# Patient Record
Sex: MALE | URBAN - METROPOLITAN AREA
[De-identification: names, ages, dates, MRNs, and addresses within clinical notes are randomized per-mention and may not be internally consistent; named-entity substitution may affect disease eponyms.]

---

## 2024-09-19 ENCOUNTER — TRANSCRIBE ORDERS (OUTPATIENT)
Age: 74
End: 2024-09-19

## 2024-09-19 DIAGNOSIS — I99.8 NONCOMPRESSIBLE BLOOD VESSELS OF EXTREMITY: Primary | ICD-10-CM

## 2024-10-04 ENCOUNTER — OFFICE VISIT (OUTPATIENT)
Age: 74
End: 2024-10-04
Payer: MEDICARE

## 2024-10-04 VITALS
BODY MASS INDEX: 29.69 KG/M2 | SYSTOLIC BLOOD PRESSURE: 148 MMHG | HEIGHT: 73 IN | WEIGHT: 224 LBS | DIASTOLIC BLOOD PRESSURE: 78 MMHG

## 2024-10-04 DIAGNOSIS — I73.9 PERIPHERAL ARTERIAL DISEASE: Primary | ICD-10-CM

## 2024-10-04 RX ORDER — LISINOPRIL 40 MG/1
TABLET ORAL DAILY
COMMUNITY
Start: 2012-09-07

## 2024-10-04 RX ORDER — DOXYCYCLINE HYCLATE 100 MG
TABLET ORAL
COMMUNITY

## 2024-10-04 RX ORDER — ALBUTEROL SULFATE 0.83 MG/ML
1 SOLUTION RESPIRATORY (INHALATION)
COMMUNITY

## 2024-10-04 RX ORDER — PANTOPRAZOLE SODIUM 40 MG/1
1 TABLET, DELAYED RELEASE ORAL 2 TIMES DAILY
COMMUNITY

## 2024-10-04 RX ORDER — DIGOXIN 125 MCG
125 TABLET ORAL DAILY
COMMUNITY
Start: 2024-07-16 | End: 2025-01-12

## 2024-10-04 RX ORDER — APIXABAN 2.5 MG/1
TABLET, FILM COATED ORAL
COMMUNITY

## 2024-10-04 RX ORDER — GENTAMICIN SULFATE 1 MG/G
OINTMENT TOPICAL
COMMUNITY

## 2024-10-04 RX ORDER — METFORMIN HCL 500 MG
TABLET, EXTENDED RELEASE 24 HR ORAL
COMMUNITY
Start: 2012-07-19

## 2024-10-04 RX ORDER — SITAGLIPTIN 100 MG/1
1 TABLET, FILM COATED ORAL DAILY
COMMUNITY

## 2024-10-04 RX ORDER — BUMETANIDE 2 MG/1
TABLET ORAL
COMMUNITY

## 2024-10-04 RX ORDER — TAMSULOSIN HYDROCHLORIDE 0.4 MG/1
CAPSULE ORAL
COMMUNITY

## 2024-10-04 RX ORDER — AMLODIPINE BESYLATE 5 MG/1
TABLET ORAL
COMMUNITY
Start: 2012-09-07

## 2024-10-04 RX ORDER — POTASSIUM CHLORIDE 1500 MG/1
20-40 TABLET, EXTENDED RELEASE ORAL
COMMUNITY

## 2024-10-04 RX ORDER — ROSUVASTATIN CALCIUM 20 MG/1
20 TABLET, COATED ORAL DAILY
COMMUNITY
Start: 2012-09-07

## 2024-10-04 RX ORDER — FINASTERIDE 5 MG/1
TABLET, FILM COATED ORAL
COMMUNITY

## 2024-10-04 RX ORDER — DAPAGLIFLOZIN 10 MG/1
1 TABLET, FILM COATED ORAL DAILY
COMMUNITY

## 2024-10-04 RX ORDER — BLOOD SUGAR DIAGNOSTIC
STRIP MISCELLANEOUS
COMMUNITY
Start: 2024-08-10

## 2024-10-04 NOTE — PROGRESS NOTES
"Chief Complaint  New Patient  (Abnormal LILLIE, PAD. )    Referral for peripheral arterial disease, nonhealing left toe wound    Subjective        Estuardo Bear presents to [facility] VASCULAR SURGERY  History of Present Illness    Pleasant 74-year-old gentleman with numerous cardiac comorbidities including aortic valve stenosis status post TAVR, cardiomyopathy status post [employer] ICD, permanent atrial fibrillation, combined systolic and diastolic heart failure, previous mitral clip in 2020, COPD, CKD, sleep apnea who is referred for nonhealing left toe wound and peripheral arterial disease.  Patient reports he developed a left medial first toe wound in July that has been debrided and followed by his podiatrist Dr. Coley, and still has not healed.  This despite his glucose being well-controlled.  He denies any history of tobacco use.  He had ABIs performed at [facility] that were noncompressible bilaterally and had noncompressible toe pressures on the left    Objective   Vital Signs:  /78   Ht 185.4 cm (73\")   Wt 102 kg (224 lb)   BMI 29.55 kg/m²   Estimated body mass index is 29.55 kg/m² as calculated from the following:    Height as of this encounter: 185.4 cm (73\").    Weight as of this encounter: 102 kg (224 lb).         BMI cannot be calculated due to outdated height or weight values.  Please input a current height/weight in Vitals and re-renter BMIFOLLOWUP in Note to pull in correct documentation based on BMI range.         Physical Exam   NAD  Respirations unlabored  Irregular rhythm  Nonpalpable pedal pulses.  DP and PT signals present bilaterally  Palpable popliteal pulses bilaterally  Small ulceration approximately 3 mm in diameter on medial aspect of left first toe, no purulence or cellulitis.  Minimal tenderness  Result Review :                     Assessment and Plan     74-year-old gentleman with cardiomyopathy and numerous other cardiac comorbidities, diabetes, " CKD referred for for nonhealing left toe wound due to concern for peripheral arterial disease.  The patient has strong DP and PT signals bilaterally but noncompressible ABIs noncompressible toe pressures on the left.  He has palpable popliteal and femoral pulses bilaterally.  Previous CTA abdomen pelvis from 4 years ago did not show significant aortoiliac disease.   I discussed the nature of peripheral arterial disease as well as rationale and indications for intervention with the patient.  I think with his toe wound having not heal despite what sounds like good wound care at podiatry in good glycemic control that arteriogram with possible intervention is warranted.  Details of this procedure including risk benefits and alternatives were discussed with the patient he verbalized understanding and agrees to proceed.  Will schedule this at his earliest convenience.  His wife is a former radiology technician and familiar with the procedure and verbalizes understanding and agreement with it as well.  He will need to hold his Eliquis for 2 days prior to the intervention as well as his metformin.      Diagnoses and all orders for this visit:    1. Peripheral arterial disease (Primary)  -     Case Request; Standing  -     ceFAZolin (ANCEF) 2,000 mg in sodium chloride 0.9 % 100 mL IVPB  -     Case Request    Other orders  -     Follow Anesthesia Guidelines / Protocol; Future  -     Follow Anesthesia Guidelines / Protocol; Standing  -     Verify NPO Status; Standing  -     Clip Operative Site; Standing  -     Patient to Void Prior to Transfer to OR; Standing  -     Verify / Perform Chlorhexidine Skin Prep; Standing  -     Provide Patient With Instructions on NPO Status; Future  -     Provide Chlorhexidine Skin Prep Wipes and Instructions; Future  -     Place Sequential Compression Device; Standing  -     Maintain Sequential Compression Device; Standing  -     Instruct Patient to Hold Medications (Specify);  Future              Patient BMI noted. Educational material for patient for health risks of being overweight added to patient's after visit summary.           Follow Up     No follow-ups on file.  Patient was given instructions and counseling regarding his condition or for health maintenance advice. Please see specific information pulled into the AVS if appropriate.

## 2024-10-17 ENCOUNTER — HOSPITAL ENCOUNTER (OUTPATIENT)
Facility: HOSPITAL | Age: 74
Setting detail: HOSPITAL OUTPATIENT SURGERY
Discharge: HOME | End: 2024-10-17
Attending: STUDENT IN AN ORGANIZED HEALTH CARE EDUCATION/TRAINING PROGRAM | Admitting: STUDENT IN AN ORGANIZED HEALTH CARE EDUCATION/TRAINING PROGRAM
Payer: MEDICARE

## 2024-10-17 ENCOUNTER — ANESTHESIA (OUTPATIENT)
Dept: PERIOP | Facility: HOSPITAL | Age: 74
End: 2024-10-17
Payer: MEDICARE

## 2024-10-17 ENCOUNTER — ANESTHESIA EVENT (OUTPATIENT)
Dept: PERIOP | Facility: HOSPITAL | Age: 74
End: 2024-10-17
Payer: MEDICARE

## 2024-10-17 ENCOUNTER — APPOINTMENT (OUTPATIENT)
Dept: GENERAL RADIOLOGY | Facility: HOSPITAL | Age: 74
End: 2024-10-17
Payer: MEDICARE

## 2024-10-17 VITALS
BODY MASS INDEX: 29.61 KG/M2 | WEIGHT: 224.43 LBS | SYSTOLIC BLOOD PRESSURE: 154 MMHG | RESPIRATION RATE: 16 BRPM | TEMPERATURE: 97.9 F | OXYGEN SATURATION: 93 % | HEART RATE: 59 BPM | DIASTOLIC BLOOD PRESSURE: 92 MMHG

## 2024-10-17 DIAGNOSIS — I73.9 PERIPHERAL ARTERIAL DISEASE: ICD-10-CM

## 2024-10-17 LAB
ALBUMIN SERPL-MCNC: 3.8 G/DL (ref 3.5–5.2)
ALBUMIN/GLOB SERPL: 1.5 G/DL
ALP SERPL-CCNC: 61 U/L (ref 39–117)
ALT SERPL W P-5'-P-CCNC: 12 U/L (ref 1–41)
ANION GAP SERPL CALCULATED.3IONS-SCNC: 8.4 MMOL/L (ref 5–15)
AST SERPL-CCNC: 13 U/L (ref 1–40)
BASOPHILS # BLD AUTO: 0.06 10*3/MM3 (ref 0–0.2)
BASOPHILS NFR BLD AUTO: 0.8 % (ref 0–1.5)
BILIRUB SERPL-MCNC: 0.7 MG/DL (ref 0–1.2)
BUN SERPL-MCNC: 10 MG/DL (ref 8–23)
BUN/CREAT SERPL: 7.4 (ref 7–25)
CALCIUM SPEC-SCNC: 8.9 MG/DL (ref 8.6–10.5)
CHLORIDE SERPL-SCNC: 102 MMOL/L (ref 98–107)
CO2 SERPL-SCNC: 25.6 MMOL/L (ref 22–29)
CREAT SERPL-MCNC: 1.35 MG/DL (ref 0.76–1.27)
DEPRECATED RDW RBC AUTO: 43.6 FL (ref 37–54)
EGFRCR SERPLBLD CKD-EPI 2021: 55.4 ML/MIN/1.73
EOSINOPHIL # BLD AUTO: 0.1 10*3/MM3 (ref 0–0.4)
EOSINOPHIL NFR BLD AUTO: 1.3 % (ref 0.3–6.2)
ERYTHROCYTE [DISTWIDTH] IN BLOOD BY AUTOMATED COUNT: 13.8 % (ref 12.3–15.4)
GLOBULIN UR ELPH-MCNC: 2.6 GM/DL
GLUCOSE BLDC GLUCOMTR-MCNC: 152 MG/DL (ref 70–130)
GLUCOSE SERPL-MCNC: 144 MG/DL (ref 65–99)
HCT VFR BLD AUTO: 44 % (ref 37.5–51)
HGB BLD-MCNC: 15.1 G/DL (ref 13–17.7)
IMM GRANULOCYTES # BLD AUTO: 0.03 10*3/MM3 (ref 0–0.05)
IMM GRANULOCYTES NFR BLD AUTO: 0.4 % (ref 0–0.5)
LYMPHOCYTES # BLD AUTO: 1.59 10*3/MM3 (ref 0.7–3.1)
LYMPHOCYTES NFR BLD AUTO: 20.6 % (ref 19.6–45.3)
MCH RBC QN AUTO: 29.5 PG (ref 26.6–33)
MCHC RBC AUTO-ENTMCNC: 34.3 G/DL (ref 31.5–35.7)
MCV RBC AUTO: 85.9 FL (ref 79–97)
MONOCYTES # BLD AUTO: 0.59 10*3/MM3 (ref 0.1–0.9)
MONOCYTES NFR BLD AUTO: 7.7 % (ref 5–12)
NEUTROPHILS NFR BLD AUTO: 5.33 10*3/MM3 (ref 1.7–7)
NEUTROPHILS NFR BLD AUTO: 69.2 % (ref 42.7–76)
NRBC BLD AUTO-RTO: 0 /100 WBC (ref 0–0.2)
PLATELET # BLD AUTO: 179 10*3/MM3 (ref 140–450)
PMV BLD AUTO: 10.2 FL (ref 6–12)
POTASSIUM SERPL-SCNC: 4.3 MMOL/L (ref 3.5–5.2)
PROT SERPL-MCNC: 6.4 G/DL (ref 6–8.5)
RBC # BLD AUTO: 5.12 10*6/MM3 (ref 4.14–5.8)
SODIUM SERPL-SCNC: 136 MMOL/L (ref 136–145)
WBC NRBC COR # BLD AUTO: 7.7 10*3/MM3 (ref 3.4–10.8)

## 2024-10-17 PROCEDURE — C1769 GUIDE WIRE: HCPCS | Performed by: STUDENT IN AN ORGANIZED HEALTH CARE EDUCATION/TRAINING PROGRAM

## 2024-10-17 PROCEDURE — 25810000003 LACTATED RINGERS PER 1000 ML: Performed by: ANESTHESIOLOGY

## 2024-10-17 PROCEDURE — 85025 COMPLETE CBC W/AUTO DIFF WBC: CPT | Performed by: STUDENT IN AN ORGANIZED HEALTH CARE EDUCATION/TRAINING PROGRAM

## 2024-10-17 PROCEDURE — 80053 COMPREHEN METABOLIC PANEL: CPT | Performed by: STUDENT IN AN ORGANIZED HEALTH CARE EDUCATION/TRAINING PROGRAM

## 2024-10-17 PROCEDURE — 25010000002 HEPARIN (PORCINE) PER 1000 UNITS: Performed by: STUDENT IN AN ORGANIZED HEALTH CARE EDUCATION/TRAINING PROGRAM

## 2024-10-17 PROCEDURE — 25010000002 LIDOCAINE 2% SOLUTION: Performed by: NURSE ANESTHETIST, CERTIFIED REGISTERED

## 2024-10-17 PROCEDURE — S0260 H&P FOR SURGERY: HCPCS | Performed by: STUDENT IN AN ORGANIZED HEALTH CARE EDUCATION/TRAINING PROGRAM

## 2024-10-17 PROCEDURE — C1894 INTRO/SHEATH, NON-LASER: HCPCS | Performed by: STUDENT IN AN ORGANIZED HEALTH CARE EDUCATION/TRAINING PROGRAM

## 2024-10-17 PROCEDURE — C1887 CATHETER, GUIDING: HCPCS | Performed by: STUDENT IN AN ORGANIZED HEALTH CARE EDUCATION/TRAINING PROGRAM

## 2024-10-17 PROCEDURE — 25010000002 PROPOFOL 10 MG/ML EMULSION: Performed by: NURSE ANESTHETIST, CERTIFIED REGISTERED

## 2024-10-17 PROCEDURE — 25010000002 BUPIVACAINE (PF) 0.25 % SOLUTION 30 ML VIAL: Performed by: STUDENT IN AN ORGANIZED HEALTH CARE EDUCATION/TRAINING PROGRAM

## 2024-10-17 PROCEDURE — 75625 CONTRAST EXAM ABDOMINL AORTA: CPT | Performed by: STUDENT IN AN ORGANIZED HEALTH CARE EDUCATION/TRAINING PROGRAM

## 2024-10-17 PROCEDURE — 82948 REAGENT STRIP/BLOOD GLUCOSE: CPT

## 2024-10-17 PROCEDURE — 75710 ARTERY X-RAYS ARM/LEG: CPT | Performed by: STUDENT IN AN ORGANIZED HEALTH CARE EDUCATION/TRAINING PROGRAM

## 2024-10-17 PROCEDURE — C1760 CLOSURE DEV, VASC: HCPCS | Performed by: STUDENT IN AN ORGANIZED HEALTH CARE EDUCATION/TRAINING PROGRAM

## 2024-10-17 PROCEDURE — 36247 INS CATH ABD/L-EXT ART 3RD: CPT | Performed by: STUDENT IN AN ORGANIZED HEALTH CARE EDUCATION/TRAINING PROGRAM

## 2024-10-17 PROCEDURE — 25510000001 IOPAMIDOL PER 1 ML: Performed by: STUDENT IN AN ORGANIZED HEALTH CARE EDUCATION/TRAINING PROGRAM

## 2024-10-17 PROCEDURE — 25010000002 CEFAZOLIN PER 500 MG: Performed by: STUDENT IN AN ORGANIZED HEALTH CARE EDUCATION/TRAINING PROGRAM

## 2024-10-17 PROCEDURE — 25010000002 LIDOCAINE 1 % SOLUTION 20 ML VIAL: Performed by: STUDENT IN AN ORGANIZED HEALTH CARE EDUCATION/TRAINING PROGRAM

## 2024-10-17 RX ORDER — NALOXONE HCL 0.4 MG/ML
0.2 VIAL (ML) INJECTION AS NEEDED
Status: DISCONTINUED | OUTPATIENT
Start: 2024-10-17 | End: 2024-10-17 | Stop reason: HOSPADM

## 2024-10-17 RX ORDER — DIPHENHYDRAMINE HYDROCHLORIDE 50 MG/ML
12.5 INJECTION, SOLUTION INTRAMUSCULAR; INTRAVENOUS
Status: DISCONTINUED | OUTPATIENT
Start: 2024-10-17 | End: 2024-10-17 | Stop reason: HOSPADM

## 2024-10-17 RX ORDER — FENTANYL CITRATE 50 UG/ML
50 INJECTION, SOLUTION INTRAMUSCULAR; INTRAVENOUS ONCE AS NEEDED
Status: DISCONTINUED | OUTPATIENT
Start: 2024-10-17 | End: 2024-10-17 | Stop reason: HOSPADM

## 2024-10-17 RX ORDER — DROPERIDOL 2.5 MG/ML
0.62 INJECTION, SOLUTION INTRAMUSCULAR; INTRAVENOUS
Status: DISCONTINUED | OUTPATIENT
Start: 2024-10-17 | End: 2024-10-17 | Stop reason: HOSPADM

## 2024-10-17 RX ORDER — LIDOCAINE HYDROCHLORIDE 10 MG/ML
0.5 INJECTION, SOLUTION INFILTRATION; PERINEURAL ONCE AS NEEDED
Status: DISCONTINUED | OUTPATIENT
Start: 2024-10-17 | End: 2024-10-17 | Stop reason: HOSPADM

## 2024-10-17 RX ORDER — LIDOCAINE HYDROCHLORIDE 20 MG/ML
INJECTION, SOLUTION INFILTRATION; PERINEURAL AS NEEDED
Status: DISCONTINUED | OUTPATIENT
Start: 2024-10-17 | End: 2024-10-17 | Stop reason: SURG

## 2024-10-17 RX ORDER — FENTANYL CITRATE 50 UG/ML
25 INJECTION, SOLUTION INTRAMUSCULAR; INTRAVENOUS
Status: DISCONTINUED | OUTPATIENT
Start: 2024-10-17 | End: 2024-10-17 | Stop reason: HOSPADM

## 2024-10-17 RX ORDER — CARVEDILOL 6.25 MG/1
6.25 TABLET ORAL 2 TIMES DAILY WITH MEALS
COMMUNITY

## 2024-10-17 RX ORDER — LABETALOL HYDROCHLORIDE 5 MG/ML
5 INJECTION, SOLUTION INTRAVENOUS
Status: DISCONTINUED | OUTPATIENT
Start: 2024-10-17 | End: 2024-10-17 | Stop reason: HOSPADM

## 2024-10-17 RX ORDER — FLUMAZENIL 0.1 MG/ML
0.2 INJECTION INTRAVENOUS AS NEEDED
Status: DISCONTINUED | OUTPATIENT
Start: 2024-10-17 | End: 2024-10-17 | Stop reason: HOSPADM

## 2024-10-17 RX ORDER — IPRATROPIUM BROMIDE AND ALBUTEROL SULFATE 2.5; .5 MG/3ML; MG/3ML
3 SOLUTION RESPIRATORY (INHALATION) ONCE AS NEEDED
Status: DISCONTINUED | OUTPATIENT
Start: 2024-10-17 | End: 2024-10-17 | Stop reason: HOSPADM

## 2024-10-17 RX ORDER — ONDANSETRON 2 MG/ML
4 INJECTION INTRAMUSCULAR; INTRAVENOUS ONCE AS NEEDED
Status: DISCONTINUED | OUTPATIENT
Start: 2024-10-17 | End: 2024-10-17 | Stop reason: HOSPADM

## 2024-10-17 RX ORDER — HYDRALAZINE HYDROCHLORIDE 20 MG/ML
5 INJECTION INTRAMUSCULAR; INTRAVENOUS
Status: DISCONTINUED | OUTPATIENT
Start: 2024-10-17 | End: 2024-10-17 | Stop reason: HOSPADM

## 2024-10-17 RX ORDER — SODIUM CHLORIDE 0.9 % (FLUSH) 0.9 %
3 SYRINGE (ML) INJECTION EVERY 12 HOURS SCHEDULED
Status: DISCONTINUED | OUTPATIENT
Start: 2024-10-17 | End: 2024-10-17 | Stop reason: HOSPADM

## 2024-10-17 RX ORDER — IOPAMIDOL 510 MG/ML
100 INJECTION, SOLUTION INTRAVASCULAR
Status: COMPLETED | OUTPATIENT
Start: 2024-10-17 | End: 2024-10-17

## 2024-10-17 RX ORDER — FAMOTIDINE 10 MG/ML
20 INJECTION, SOLUTION INTRAVENOUS ONCE
Status: COMPLETED | OUTPATIENT
Start: 2024-10-17 | End: 2024-10-17

## 2024-10-17 RX ORDER — SODIUM CHLORIDE, SODIUM LACTATE, POTASSIUM CHLORIDE, CALCIUM CHLORIDE 600; 310; 30; 20 MG/100ML; MG/100ML; MG/100ML; MG/100ML
9 INJECTION, SOLUTION INTRAVENOUS CONTINUOUS
Status: DISCONTINUED | OUTPATIENT
Start: 2024-10-17 | End: 2024-10-17 | Stop reason: HOSPADM

## 2024-10-17 RX ORDER — PROMETHAZINE HYDROCHLORIDE 25 MG/1
25 TABLET ORAL ONCE AS NEEDED
Status: DISCONTINUED | OUTPATIENT
Start: 2024-10-17 | End: 2024-10-17 | Stop reason: HOSPADM

## 2024-10-17 RX ORDER — HYDROCODONE BITARTRATE AND ACETAMINOPHEN 7.5; 325 MG/1; MG/1
1 TABLET ORAL EVERY 4 HOURS PRN
Status: DISCONTINUED | OUTPATIENT
Start: 2024-10-17 | End: 2024-10-17 | Stop reason: HOSPADM

## 2024-10-17 RX ORDER — PROMETHAZINE HYDROCHLORIDE 25 MG/1
25 SUPPOSITORY RECTAL ONCE AS NEEDED
Status: DISCONTINUED | OUTPATIENT
Start: 2024-10-17 | End: 2024-10-17 | Stop reason: HOSPADM

## 2024-10-17 RX ORDER — HYDROCODONE BITARTRATE AND ACETAMINOPHEN 5; 325 MG/1; MG/1
1 TABLET ORAL ONCE AS NEEDED
Status: DISCONTINUED | OUTPATIENT
Start: 2024-10-17 | End: 2024-10-17 | Stop reason: HOSPADM

## 2024-10-17 RX ORDER — HYDROMORPHONE HYDROCHLORIDE 1 MG/ML
0.25 INJECTION, SOLUTION INTRAMUSCULAR; INTRAVENOUS; SUBCUTANEOUS
Status: DISCONTINUED | OUTPATIENT
Start: 2024-10-17 | End: 2024-10-17 | Stop reason: HOSPADM

## 2024-10-17 RX ORDER — FERROUS SULFATE 325(65) MG
325 TABLET ORAL
COMMUNITY
Start: 2024-07-15

## 2024-10-17 RX ORDER — EPHEDRINE SULFATE 50 MG/ML
5 INJECTION, SOLUTION INTRAVENOUS ONCE AS NEEDED
Status: DISCONTINUED | OUTPATIENT
Start: 2024-10-17 | End: 2024-10-17 | Stop reason: HOSPADM

## 2024-10-17 RX ORDER — SODIUM CHLORIDE 0.9 % (FLUSH) 0.9 %
3-10 SYRINGE (ML) INJECTION AS NEEDED
Status: DISCONTINUED | OUTPATIENT
Start: 2024-10-17 | End: 2024-10-17 | Stop reason: HOSPADM

## 2024-10-17 RX ADMIN — SODIUM CHLORIDE, POTASSIUM CHLORIDE, SODIUM LACTATE AND CALCIUM CHLORIDE 9 ML/HR: 600; 310; 30; 20 INJECTION, SOLUTION INTRAVENOUS at 07:20

## 2024-10-17 RX ADMIN — IOPAMIDOL 46 ML: 510 INJECTION, SOLUTION INTRAVASCULAR at 08:13

## 2024-10-17 RX ADMIN — FAMOTIDINE 20 MG: 10 INJECTION INTRAVENOUS at 07:20

## 2024-10-17 RX ADMIN — LIDOCAINE HYDROCHLORIDE 60 MG: 20 INJECTION, SOLUTION INFILTRATION; PERINEURAL at 07:51

## 2024-10-17 RX ADMIN — SODIUM CHLORIDE 2000 MG: 900 INJECTION INTRAVENOUS at 07:20

## 2024-10-17 RX ADMIN — PROPOFOL 75 MCG/KG/MIN: 10 INJECTION, EMULSION INTRAVENOUS at 07:51

## 2024-10-17 NOTE — OP NOTE
Date of Admission:  10/17/2024  10/17/24  Terrance Andersen II, MD  [facility]    Preoperative Diagnosis:   Peripheral arterial disease with minor tissue loss    Postoperative Diagnosis:   Same    Procedure Performed:   Ultrasound-guided access right common femoral artery  Abdominal aortogram  Left leg arteriogram  Selective catheter placement right common femoral artery to left superficial femoral artery  Mynx closure right common femoral artery          Surgeon:   Terrance Andersen II, MD    Assistant:    Rosa Maria Lloyd RN, Provided critical assistance in exposure, retraction, and suction that overall decrease blood loss and operative time.    Anesthesia:   MAC with local    Estimated Blood Loss:   Minimal    Findings:    Abdominal aortogram: Patent renal arteries bilaterally without evidence of stenosis, widely patent infrarenal aorta with no stenosis or aneurysm identified, widely patent common internal and external iliac arteries with no evidence of stenosis or aneurysm.    Left leg arteriogram: Medial calcinosis noted throughout lower extremity arteries, common femoral artery widely patent, profundofemoral artery widely patent, superficial femoral artery widely patent, popliteal artery widely patent, patient has high takeoff of anterior tibial artery that completely occludes shortly after takeoff, posterior tibial artery widely patent and provides primary runoff to the left foot, peroneal artery patent and terminates at anatomically normal position just above the foot.  Numerous collaterals noted below the knee with reconstitution of dorsalis pedis artery in the foot.  Plantar arteries supplied by posterior tibial patent to the toes with filling of digital arteries noted in slight hyperemic blush noted in the area of patients toe wound.     Implants:    Nothing was implanted during the procedure    Specimen:   none    Complications:   none    Access:  5 Russian retrograde access right common femoral  artery    Closure:  5 Kiswahili MYNX closure device    Dispo:  To PACU    Indication for procedure:   74 y.o. male with numerous medical comorbidities newly identified as having peripheral arterial disease with noncompressible arteries on ABIs and noncompressible toe pressures as well.  He was referred to me after having the studies done at [facility].  Plans made for left leg arteriogram.  Details of this procedure including risk benefits and alternatives were discussed with the patient and he verbalized understanding and agrees to proceed.    Description of procedure:   Patient is taken to the OR and placed supine on the table.  Sedation was initiated by the anesthesia service.  Groins were prepped with ChloraPrep bilaterally and his draped in sterile fashion.  A timeout performed.  I began procedure by marking the site of the right femoral head under fluoroscopy.  I then used ultrasound evaluate the right common femoral artery.  Local anesthetic was infiltrated over the right common femoral artery under ultrasound guidance.  Then under ultrasound guidance I accessed the right common femoral artery with a microneedle microwire was inserted.  Placement of the femoral head was confirmed with fluoroscopy.  I then exchanged microneedle for microsheath and performed angiogram of the access site.  This looked appropriate.  I then advanced a Glidewire through the micro sheath and into the aorta.  Microsheath was exchanged for a 10 cm and a 5 Georgian sheath.  An Omni Flush catheter was advanced over the wire and an abdominal aortogram was performed with the above listed findings.  I then used the Omni Flush catheter to select the left iliac system and was able to advance the wire and catheter into the distal left external iliac artery.  We then performed a left leg runoff arteriogram from this position with the above listed findings.    I then advanced 260cm Glidewire advantage through the Omni Flush catheter  and into the superficial femoral artery.  Omni Flush was removed and an angled Bernal cross catheter was used to advance the wire and catheter down through the superficial femoral artery and into the popliteal artery.  I then performed angiography of the lower leg and foot through this Bernal cross catheter.  Patient has inline flow to the foot via posterior tibial artery which has significant medial calcinosis but no high-grade stenoses.  Anterior tibial artery had long segment occlusion and did not appear to be a good target for revascularization..  There was filling of digital arteries with some slight hyperemia at the area of the patient's toe wound.  I did not see a good target for intervention.  The Bernal cross catheter was removed and then we closed the right common femoral artery with a Mynx device and pressure was held until hemostasis was obtained.  We confirmed the patient had signals in his foot at the conclusion of the procedure.  Patient tolerated procedure well and there were no IntraOp complications and all wires catheters sheaths and other devices were removed and found to be whole and intact prior to the conclusion of the procedure.    Terrance Andersen II, MD  10/17/24    Active Hospital Problems    Diagnosis  POA    **Peripheral arterial disease [I73.9]  Yes      Resolved Hospital Problems   No resolved problems to display.

## 2024-10-17 NOTE — H&P
"  Name: Estuardo Bear ADMIT: 10/17/2024   : 1950  PCP: Terrance King MD    MRN: [id number] LOS: 0 days   AGE/SEX: 74 y.o. male  ROOM: [facility] Main OR/MAIN OR     No chief complaint on file.      Subjective   Patient is a 74 y.o. male presents for left leg arteriogram.  Patient has peripheral arterial disease with tissue loss on his left first, and now fourth and fifth toes.  ABIs from outside facility are noncompressible of the tibial arteries and digital arteries with nonpalpable pedal pulses on exam.    History of Present Illness    History reviewed. No pertinent past medical history.  Past Surgical History:   Procedure Laterality Date    ANOMALOUS PULMONARY VENOUS RETURN REPAIR, TOTAL      APPENDECTOMY      CARDIAC CATHETERIZATION      CARDIAC SURGERY      COLONOSCOPY      ENDOSCOPY      EYE SURGERY      HERNIA REPAIR       History reviewed. No pertinent family history.  Social History     Tobacco Use    Smoking status: Never   Substance Use Topics    Alcohol use: Never    Drug use: Never     Medications Prior to Admission   Medication Sig Dispense Refill Last Dose/Taking    Accu-Chek Berta Plus test strip    10/17/2024 at  1:30 AM    bumetanide (BUMEX) 2 MG tablet TAKE ONE TABLET BY MOUTH TWICE A DAY AS DIRECTED. TAKE ONE-HALF TO ONE TABLET EXTRA FOR GREATER THAN 3 LBS IN 24 HOURS.   10/17/2024 at  2:00 AM    carvedilol (COREG) 6.25 MG tablet Take 1 tablet by mouth 2 (Two) Times a Day With Meals. \"PT. STATES HE TAKES THREE TABLETS BY MOUTH TWO TIMES DAILY\"   10/17/2024 at  2:00 AM    digoxin (LANOXIN) 125 MCG tablet Take 1 tablet by mouth Daily.   10/16/2024 at  6:00 PM    Farxiga 10 MG tablet Take 10 mg by mouth Daily.   10/17/2024 at  2:00 AM    FeroSul 325 (65 Fe) MG tablet Take 1 tablet by mouth Daily With Breakfast.   10/17/2024 at  2:00 AM    finasteride (PROSCAR) 5 MG tablet TAKE ONE TABLET BY MOUTH ONCE PER DAY   10/17/2024 at  2:00 AM    gentamicin (GARAMYCIN) 0.1 % ointment APPLY " A SMALL AMOUNT TO THE AFFECTED AREA BY TOPICAL ROUTE 3 TIMES PER DAY   10/16/2024 at  6:00 PM    Januvia 100 MG tablet Take 1 tablet by mouth Daily.   10/17/2024 at  2:00 AM    pantoprazole (PROTONIX) 40 MG EC tablet Take 1 tablet by mouth 2 (Two) Times a Day.   10/17/2024 at  2:00 AM    potassium chloride (KLOR-CON M20) 20 MEQ CR tablet Take 1-2 tablets by mouth.   10/17/2024 at  2:00 AM    rosuvastatin (CRESTOR) 20 MG tablet Take 1 tablet by mouth Daily.   10/17/2024 at  2:00 AM    sacubitril-valsartan (ENTRESTO)  MG tablet Take 1 tablet by mouth 2 (Two) Times a Day.   10/17/2024 at  2:00 AM    tamsulosin (FLOMAX) 0.4 MG capsule 24 hr capsule TAKE ONE CAPSULE BY MOUTH EVERY DAY AS DIRECTED   10/16/2024 at  6:00 PM    albuterol (PROVENTIL) (2.5 MG/3ML) 0.083% nebulizer solution Inhale 1 ampule.   More than a month    amLODIPine (NORVASC) 5 MG tablet TAKE ONE TABLET BY MOUTH ONCE a DAY DOSE INCREASE   More than a month    doxycycline (VIBRAMYICN) 100 MG tablet Take 1 tablet twice a day by oral route as directed for 14 days, for cellulitis.   More than a month    Eliquis 2.5 MG tablet tablet TAKE ONE TABLET BY MOUTH TWICE A DAY AS DIRECTED   10/15/2024 at  7:00 AM    lisinopril (PRINIVIL,ZESTRIL) 40 MG tablet Take  by mouth Daily.   More than a month    metFORMIN ER (GLUCOPHAGE-XR) 500 MG 24 hr tablet TAKE TWO TABLETS BY MOUTH TWICE A DAY AS DIRECTED   10/15/2024 at  7:00 AM     Allergies:  Ferumoxytol and Penicillins    Review of Systems     Objective    Vital Signs  Temp:  [97.9 °F (36.6 °C)] 97.9 °F (36.6 °C)  Heart Rate:  [60] 60  Resp:  [18] 18  BP: (169)/(81) 169/81  SpO2:  [97 %] 97 %  on   ;   Device (Oxygen Therapy): room air  Body mass index is 29.61 kg/m².    Physical Exam  NAD  NCAT  Respirations unlabored  Palpable popliteal pulses  Nonpalpable pedal pulses  Approximately 8 mm diameter superficial ulceration on medial aspect of left first toe with no surrounding cellulitis.  Scabs noted on dorsal  aspect of fourth and fifth toes which are new.    Results Review:   I reviewed the patient's new clinical results.  Imaging Results (Last 24 Hours)       ** No results found for the last 24 hours. **            Assessment & Plan       Peripheral arterial disease      Assessment & Plan  74-year-old gentleman with numerous medical comorbidities including congestive heart failure/cardiomyopathy, atrial fibrillation, aortic valve stenosis status post TAVR, ICD placement, previous mitral clip, COPD, CKD, sleep apnea, now with peripheral arterial disease with tissue loss in his left foot presenting for arteriogram with possible intervention.  Details of this procedure including risks benefits and alternatives were discussed with the patient and his wife and they verbalized understanding and agreed to proceed.  Planning for outpatient procedure today.  Cefazolin ordered for perioperative antibiotics.        I discussed the patients findings and my recommendations with patient and family.          Terrance Andersen II, MD  [facility]  [phone]  10/17/24  07:16 EDT

## 2024-10-17 NOTE — DISCHARGE INSTRUCTIONS
[facility]  Patel Pool, Giovani Park Scherrer, [place], [place]  [address]  [place] [address]  [phone]      Discharge Instructions for Angiogram    Go home, rest and take it easy today.      You may experience some dizziness or memory loss from the anesthesia.  This may last for the next 24 hours.  Someone should plan on staying with you for the first 24 hours for your safety.    Do not make any important legal decisions or sign any legal papers for the next 24 hours.      Eat and drink lightly today.  Start off with liquids, jello, soup, crackers or other bland foods at first. You may advance your diet tomorrow as tolerated as long as you do not experience any nausea or vomiting.    You may resume routine medications including blood thinners. However, Glucophage should be not be started for 72 hours after the dye is given.      No lifting over 10 pounds and no strenuous activity for the next 2-3 days.    Try not to strain or bear down when your bowels move or when you empty your bladder.    Limit going up and down steps for 2 days.    No driving for the remainder of the day.  You may resume limited driving tomorrow if necessary.      You may shower tomorrow.  No bath or hot tubs for at least 2 days after the procedure.          Leave the pressure dressing on until tomorrow morning.  You may then take this off, as well as the small see through dressing with gauze tomorrow.  If it doesn’t come off easily, do not pull this off.  If it is stuck, shower and let the warm water loosen it before removal.       Check your groin dressing regularly for bleeding through the dressing (under the pressure dressing).  A small amount of blood contained by the gauze is normal; the whole dressing should not be filled with blood or leaking out under the sides.     If you experience bleeding through the gauze/clear sticky dressing, lay flat and have someone apply direct pressure for  15 minutes.  If bleeding has stopped after this, you may put a clean gauze and tape over the area.  Continue to lie flat for 1-2 hours.  If bleeding continues after 15 minutes of pressure, call us at the number listed above.  There is an MD available after hours.      If you experience heavy bleeding or large swelling, continue to hold firm pressure and              call 911.  Do not call the MD on call.      If you experience pain or discomfort you may take Tylenol or Ibuprofen, whichever you normally use for minor discomfort, unless otherwise instructed.        If the MD gives you a prescription for narcotic pain pills (Tylenol #3, Vicodin, Hydrocodone, Oxycodone or Percocet), you cannot drive a vehicle or operate machinery while taking these.     Severe pain is not expected after this procedure.  If you experience severe pain, please call our office at [phone].     Remember to contact our office for any of the following:    Fever > 101 degrees  Severe pain that cannot be controlled by taking your pain pills  Severe nausea or vomiting   Significant bleeding of your incisions  Drainage that has a bad smell or is yellow or green in appearance  Any other questions or concerns

## 2024-10-17 NOTE — NURSING NOTE
Patient  and spouse not sure of  current medication list. Spouse had copy of medications from pharmacy; R.N. entered medications on list to chart .  Spouse is to check with family physician  to see if patient is  to be taking  Lisinopril  and Norvasc.  Spouse verbalized understanding of the  above.

## 2024-10-17 NOTE — ANESTHESIA POSTPROCEDURE EVALUATION
Patient: Estuardo Bear    Procedure Summary       Date: 10/17/24 Room / [facility]    Anesthesia Start: 0728 Anesthesia Stop: 0834    Procedure: LOWER EXTREMITY ARTERIOGRAM (Left: Thigh) Diagnosis:       Peripheral arterial disease      (Peripheral arterial disease [I73.9])    Surgeons: Terrance Andersen II, MD Provider: Reji Meyers MD    Anesthesia Type: MAC ASA Status: 4            Anesthesia Type: MAC    Vitals  Vitals Value Taken Time   /82 10/17/24 0845   Temp     Pulse 59 10/17/24 0855   Resp     SpO2 94 % 10/17/24 0855   Vitals shown include unfiled device data.        Post Anesthesia Care and Evaluation    Patient location during evaluation: bedside  Patient participation: complete - patient participated  Level of consciousness: awake and alert  Pain management: adequate    Airway patency: patent  Anesthetic complications: No anesthetic complications  PONV Status: controlled  Cardiovascular status: acceptable and hemodynamically stable  Respiratory status: acceptable, spontaneous ventilation and nonlabored ventilation  Hydration status: acceptable    Comments: /84 (BP Location: Right arm, Patient Position: Lying)   Pulse 64   Temp 36.6 °C (97.9 °F) (Oral)   Resp 15   Wt 102 kg (224 lb 6.9 oz)   SpO2 95%   BMI 29.61 kg/m²

## 2024-10-17 NOTE — ANESTHESIA PREPROCEDURE EVALUATION
Anesthesia Evaluation     Patient summary reviewed   NPO Solid Status: > 8 hours  NPO Liquid Status: > 2 hours           Airway   Mallampati: II  TM distance: >3 FB  Neck ROM: full  No difficulty expected  Dental    (+) lower dentures and upper dentures    Pulmonary    (+) COPD,sleep apnea  Cardiovascular   Exercise tolerance: good (4-7 METS)    ECG reviewed  PT is on anticoagulation therapy  Rhythm: irregular    (+) pacemaker ICD, pacemaker interrogated <1 month ago, hypertension, valvular problems/murmurs, dysrhythmias Atrial Fib, CHF Systolic <55%, PVD      Neuro/Psych  GI/Hepatic/Renal/Endo    (+) obesity, diabetes mellitus    Musculoskeletal     Abdominal   (+) obese   Substance History      OB/GYN          Other        ROS/Med Hx Other: S/P repair for anomalous pulmonary venous drainage.  S/P TAVR and mitral clip.  Combined systolic and diastolic failure.  Permanent Afib, pacemaker defibrillator, EF 45% 2023. VVI paced.                Anesthesia Plan    ASA 4     MAC     intravenous induction     Anesthetic plan, risks, benefits, and alternatives have been provided, discussed and informed consent has been obtained with: patient.    CODE STATUS:

## 2024-11-07 NOTE — PROGRESS NOTES
"Chief Complaint  Post-op Follow-up    Referral for peripheral arterial disease, nonhealing left toe wound    Subjective        Estuardo Bear presents to [facility] VASCULAR SURGERY  History of Present Illness    Pleasant 74-year-old gentleman with numerous cardiac comorbidities including aortic valve stenosis status post TAVR, cardiomyopathy status post [employer] ICD, permanent atrial fibrillation, combined systolic and diastolic heart failure, previous mitral clip in 2020, COPD, CKD, sleep apnea who is referred for nonhealing left toe wound and peripheral arterial disease.  Patient reports he developed a left medial first toe wound in July that has been debrided and followed by his podiatrist Dr. Coley, and still has not healed.  This despite his glucose being well-controlled.  He denies any history of tobacco use.  He had ABIs performed at [facility] that were noncompressible bilaterally and had noncompressible toe pressures on the left    On 10/17/2024 the patient was taken for a left leg arteriogram.  He had inline flow to the foot via posterior tibial artery, with patent peroneal artery as well.  Anterior tibial artery was occluded but reconstituted distally via numerous collaterals.  No intervention was performed.  He is here today for routine follow-up.    He reports no significant changes since his arteriogram.  The wound has been slowly improving according to patient's wife.  They are still seeing wound care through Dr. Coley's office.    Objective   Vital Signs:  Ht 185.4 cm (73\")   Wt 102 kg (224 lb)   BMI 29.55 kg/m²   Estimated body mass index is 29.55 kg/m² as calculated from the following:    Height as of this encounter: 185.4 cm (73\").    Weight as of this encounter: 102 kg (224 lb).       BMI is >= 25 and <30. (Overweight) The following options were offered after discussion;: information on healthy weight added to patient's after visit summary            Physical " Exam   NAD  Respirations unlabored  Irregular rhythm  Nonpalpable pedal pulses.  DP and PT signals present bilaterally  Small ulceration approximately 3 mm in diameter on medial aspect of left first toe, no purulence or cellulitis.  Minimal tenderness  Result Review :                             Assessment and Plan     74-year-old gentleman with cardiomyopathy and numerous other cardiac comorbidities, diabetes, CKD referred for for nonhealing left toe wound due to concern for peripheral arterial disease.      The patient underwent left leg arteriogram that demonstrated inline flow to his foot via posterior tibial artery with patent peroneal artery as well.  No intervention performed.  Patient should have adequate perfusion to heal toe wound.    I do not think he needs any further vascular intervention at this time.  I encouraged him to continue seeing his wound care specialist through Dr. Coley's office.  I will reach out to them to let them know I think his perfusion is adequate for wound healing.  If he continues to have problems may benefit from hyperbaric oxygen, he will be a candidate for this considering his other medical comorbidities.    He has resumed his Eliquis and rosuvastatin and I encouraged him to continue both of these medications as well as all of his other medications as prescribed by his other doctors.    I will see him back in 6 months with repeat ABIs.    Diagnoses and all orders for this visit:    1. Peripheral arterial disease (Primary)  -     Doppler Ankle Brachial Index Single Level CAR; Future                Patient BMI noted. Educational material for patient for health risks of being overweight added to patient's after visit summary.           Follow Up     Return in about 6 months (around 5/8/2025).  Patient was given instructions and counseling regarding his condition or for health maintenance advice. Please see specific information pulled into the AVS if appropriate.

## 2024-11-08 ENCOUNTER — OFFICE VISIT (OUTPATIENT)
Age: 74
End: 2024-11-08
Payer: MEDICARE

## 2024-11-08 VITALS — WEIGHT: 224 LBS | BODY MASS INDEX: 29.69 KG/M2 | HEIGHT: 73 IN

## 2024-11-08 DIAGNOSIS — I73.9 PERIPHERAL ARTERIAL DISEASE: Primary | ICD-10-CM

## (undated) DEVICE — MYNXGRIP 5F: Brand: MYNXGRIP

## (undated) DEVICE — RADIFOCUS TORQUE DEVICE MULTI-TORQUE VISE: Brand: RADIFOCUS TORQUE DEVICE

## (undated) DEVICE — GLV SURG SENSICARE PI LF PF 7.5 GRN STRL

## (undated) DEVICE — CVR PROB 96IN LF STRL

## (undated) DEVICE — CATH GUIDE SOFTVU SELECT/V HT OMNI .038 5F 65CM

## (undated) DEVICE — RADIFOCUS GLIDEWIRE ADVANTAGE GUIDEWIRE: Brand: GLIDEWIRE ADVANTAGE

## (undated) DEVICE — SOL NACL 0.9PCT 1000ML

## (undated) DEVICE — ST ACC MICROPUNCTURE STFF .018 ECHO/PLAT/TP 4F/10CM 21G/7CM

## (undated) DEVICE — GLV SURG BIOGEL LTX PF 7 1/2

## (undated) DEVICE — PK ANGIO 40

## (undated) DEVICE — SYR LUERLOK 5CC

## (undated) DEVICE — SYR LL TP 10ML STRL

## (undated) DEVICE — RADIFOCUS GLIDEWIRE: Brand: GLIDEWIRE

## (undated) DEVICE — SYRINGE KIT,PACKAGED,,150FT,MK 7(ANGIO-ARTERION, 150ML SYR KIT W/QFT,MC)(60729385): Brand: MEDRAD® MARK 7 ARTERION DISPOSABLE SYRINGE 150 ML WITH QUICK FILL TUBE

## (undated) DEVICE — SYR LUERLOK 30CC

## (undated) DEVICE — SYR LUERLOK 20CC BX/50

## (undated) DEVICE — TOTAL TRAY, 16FR 10ML SIL FOLEY, URN: Brand: MEDLINE

## (undated) DEVICE — NAVICROSS SUPPORT CATHETER: Brand: NAVICROSS